# Patient Record
Sex: FEMALE | Race: WHITE | NOT HISPANIC OR LATINO | Employment: FULL TIME | URBAN - METROPOLITAN AREA
[De-identification: names, ages, dates, MRNs, and addresses within clinical notes are randomized per-mention and may not be internally consistent; named-entity substitution may affect disease eponyms.]

---

## 2017-03-20 ENCOUNTER — SLEEP CENTER VISIT (OUTPATIENT)
Dept: SLEEP MEDICINE | Facility: MEDICAL CENTER | Age: 57
End: 2017-03-20
Payer: COMMERCIAL

## 2017-03-20 VITALS
TEMPERATURE: 98.1 F | OXYGEN SATURATION: 96 % | HEIGHT: 71 IN | DIASTOLIC BLOOD PRESSURE: 94 MMHG | WEIGHT: 293 LBS | BODY MASS INDEX: 41.02 KG/M2 | SYSTOLIC BLOOD PRESSURE: 132 MMHG | RESPIRATION RATE: 16 BRPM | HEART RATE: 60 BPM

## 2017-03-20 DIAGNOSIS — G47.33 OSA (OBSTRUCTIVE SLEEP APNEA): ICD-10-CM

## 2017-03-20 PROCEDURE — 99213 OFFICE O/P EST LOW 20 MIN: CPT | Performed by: NURSE PRACTITIONER

## 2017-03-20 NOTE — PATIENT INSTRUCTIONS
1) Continue CPAP at 7cmH20  2) Clean mask and supplies weekly and change them as insurance allows  3) Return in about 6 months (around 9/20/2017) for Compliance, review of symptoms, if not sooner, follow up with GRETEL Guthrie.

## 2017-03-20 NOTE — PROGRESS NOTES
CC:  Here for f/u sleep issues as listed below    HPI:   Perla presents today for follow up obstructive sleep apnea.  PSG from 4/2016 indicated an AHI of 43.1 and low oxygen of 82%.  Currently she is being treated with CPAP @ 11lpD46.  Compliance download from the dates 2/18/2017 - 3/19/2017 indicates she is wearing the device 100% for an avg of 5 hours and 30 minutes per night with a reduced AHI of 0.5.  She does tolerate pressure and mask well.  She wakes up refreshed and denies morning H/A. She is sleeping better overall. She has lost 17 pounds since starting the machine. Has been at a plateau, because of the weather, but plans to get outside in the warm days ahead. She does not like going to the gym. She loves her machine and has more energy! she will continue to clean supplies weekly and change them as insurance allows.         Patient Active Problem List    Diagnosis Date Noted   • TOM (obstructive sleep apnea) 12/07/2016       Past Medical History   Diagnosis Date   • Hypertension    • Chickenpox        Past Surgical History   Procedure Laterality Date   • Hysterectomy laparoscopy     • Laminotomy         Family History   Problem Relation Age of Onset   • Hypertension Mother    • Diabetes Father        Social History     Social History   • Marital Status:      Spouse Name: N/A   • Number of Children: N/A   • Years of Education: N/A     Occupational History   • Not on file.     Social History Main Topics   • Smoking status: Never Smoker    • Smokeless tobacco: Never Used   • Alcohol Use: Yes      Comment: socially   • Drug Use: No   • Sexual Activity: Not on file     Other Topics Concern   • Not on file     Social History Narrative       Current Outpatient Prescriptions   Medication Sig Dispense Refill   • metoprolol (LOPRESSOR) 25 MG Tab Take 25 mg by mouth 2 times a day.     • aspirin 81 MG tablet Take 81 mg by mouth two times a day may change times on alt/days.       No current  "facility-administered medications for this visit.          Allergies: Review of patient's allergies indicates no known allergies.      ROS   Gen: Denies fever, chills, unintentional weight loss, fatigue  Resp:Denies Dyspnea  CV: Denies chest pain, chest tightness  Sleep:Denies morning headache, insomnia, daytime somnolence, snoring, gasping for air, apnea  Neuro: Denies frequent headaches, weakness, dizziness  See HPI.  All other systems reviewed and negative        Vital signs for this encounter:  Filed Vitals:    03/20/17 0830   Height: 1.803 m (5' 10.98\")   Weight: 135.626 kg (299 lb)   Weight % change since last entry.: 0 %   BP: 132/94   Pulse: 60   BMI (Calculated): 41.72   Resp: 16   Temp: 36.7 °C (98.1 °F)   O2 sat % room air: 96 %                   Physical Exam:   Gen:         Alert and oriented, No apparent distress.   Neck:        No Lymphadenopathy.  Lungs:     Clear to auscultation bilaterally.    CV:          Regular rate and rhythm. No murmurs, rubs or gallops.   Abd:         Soft non tender, non distended.            Ext:          No clubbing, cyanosis, edema.    Assessment   1. TOM (obstructive sleep apnea)  DME MASK AND SUPPLIES       PLAN:   Patient Instructions   1) Continue CPAP at 7cmH20  2) Clean mask and supplies weekly and change them as insurance allows  3) Return in about 6 months (around 9/20/2017) for Compliance, review of symptoms, if not sooner, follow up with GRETEL Guthrie.          "

## 2017-03-20 NOTE — MR AVS SNAPSHOT
"Perla Man   3/20/2017 8:40 AM   Sleep Center Visit   MRN: [id number]    Department:  [facility]   Dept Phone:  [phone]    Description:  Female : 1960   Provider:  CASE Colon           Reason for Visit     Apnea Last seen 16      Allergies as of 3/20/2017     No Known Allergies      Vital Signs     Blood Pressure Pulse Temperature Respirations Height Weight    132/94 mmHg 60 36.7 °C (98.1 °F) 16 1.803 m (5' 10.98\") 135.626 kg (299 lb)    Body Mass Index Oxygen Saturation Smoking Status             41.72 kg/m2 96% Never Smoker          Basic Information     Date Of Birth Sex Race Ethnicity Preferred Language    1960 Female White Non- English      Your appointments     Sep 25, 2017  8:00 AM   Follow UP with CASE Colon   [facility] (--)    [address] [address]   [phone]              Problem List              ICD-10-CM Priority Class Noted - Resolved    TOM (obstructive sleep apnea) G47.33   2016 - Present      Health Maintenance        Date Due Completion Dates    IMM DTaP/Tdap/Td Vaccine (1 - Tdap) 1981 ---    PAP SMEAR 1983 ---    MAMMOGRAM 2002 ---    COLONOSCOPY 2012 ---    IMM INFLUENZA (1) 2016 10/1/2015            Current Immunizations     Influenza TIV (IM) 10/1/2015      Below and/or attached are the medications your provider expects you to take. Review all of your home medications and newly ordered medications with your provider and/or pharmacist. Follow medication instructions as directed by your provider and/or pharmacist. Please keep your medication list with you and share with your provider. Update the information when medications are discontinued, doses are changed, or new medications (including over-the-counter products) are added; and carry medication information at all times in the event of emergency situations     Allergies:  No Known " Allergies          Medications  Valid as of: March 20, 2017 -  8:52 AM    Generic Name Brand Name Tablet Size Instructions for use    Aspirin (Tab) aspirin 81 MG Take 81 mg by mouth two times a day may change times on alt/days.        Metoprolol Tartrate (Tab) LOPRESSOR 25 MG Take 25 mg by mouth 2 times a day.        .                 Medicines prescribed today were sent to:     [id number] - [place], [place] - [address]    [address] [place] [address]    Phone: [phone] Fax: [phone]    Open 24 Hours?: No    DME [facility] [place]    [address] [place] [address]    Phone: [phone] Fax: [phone]      Medication refill instructions:       If your prescription bottle indicates you have medication refills left, it is not necessary to call your provider’s office. Please contact your pharmacy and they will refill your medication.    If your prescription bottle indicates you do not have any refills left, you may request refills at any time through one of the following ways: The online [employer] system (except Urgent Care), by calling your provider’s office, or by asking your pharmacy to contact your provider’s office with a refill request. Medication refills are processed only during regular business hours and may not be available until the next business day. Your provider may request additional information or to have a follow-up visit with you prior to refilling your medication.   *Please Note: Medication refills are assigned a new Rx number when refilled electronically. Your pharmacy may indicate that no refills were authorized even though a new prescription for the same medication is available at the pharmacy. Please request the medicine by name with the pharmacy before contacting your provider for a refill.        Instructions    1) Continue CPAP at 7cmH20  2) Clean mask and supplies weekly and change them as insurance allows  3) Return in about 6 months (around  9/20/2017) for Compliance, review of symptoms, if not sooner, follow up with GRETEL Guthrie.              [employer] Access Code: [email or web address]  Expires: 3/27/2017 11:09 AM    [employer]  A secure, online tool to manage your health information     [employer]® is a secure, online tool that connects you to your personalized health information from the privacy of your home -- day or night - making it very easy for you to manage your healthcare. Once the activation process is completed, you can even access your medical information using the [employer] cruzito, which is available for free in the [employer] store or Google Play store.     [employer] provides the following levels of access (as shown below):   My Chart Features   [facility] Doctor [facility]  Urgent  Care Non-[facility]  Primary Care  Doctor   Email your healthcare team securely and privately 24/7 X X X    Manage appointments: schedule your next appointment; view details of past/upcoming appointments X      Request prescription refills. X      View recent personal medical records, including lab and immunizations X X X X   View health record, including health history, allergies, medications X X X X   Read reports about your outpatient visits, procedures, consult and ER notes X X X X   See your discharge summary, which is a recap of your hospital and/or ER visit that includes your diagnosis, lab results, and care plan. X X       How to register for [employer]:  1. Go to  [email or web address].  2. Click on the Sign Up Now box, which takes you to the New Member Sign Up page. You will need to provide the following information:  a. Enter your [employer] Access Code exactly as it appears at the top of this page. (You will not need to use this code after you’ve completed the sign-up process. If you do not sign up before the expiration date, you must request a new code.)   b. Enter your date of birth.   c. Enter your home email address.    d. Click Submit, and follow the next screen’s instructions.  3. Create a [employer] ID. This will be your [employer] login ID and cannot be changed, so think of one that is secure and easy to remember.  4. Create a [employer] password. You can change your password at any time.  5. Enter your Password Reset Question and Answer. This can be used at a later time if you forget your password.   6. Enter your e-mail address. This allows you to receive e-mail notifications when new information is available in [employer].  7. Click Sign Up. You can now view your health information.    For assistance activating your [employer] account, call [phone]

## 2017-09-06 ENCOUNTER — TELEPHONE (OUTPATIENT)
Dept: PULMONOLOGY | Facility: HOSPICE | Age: 57
End: 2017-09-06

## 2017-09-06 DIAGNOSIS — G47.33 OBSTRUCTIVE SLEEP APNEA: ICD-10-CM

## 2017-09-27 ENCOUNTER — SLEEP CENTER VISIT (OUTPATIENT)
Dept: SLEEP MEDICINE | Facility: MEDICAL CENTER | Age: 57
End: 2017-09-27
Payer: COMMERCIAL

## 2017-09-27 VITALS
TEMPERATURE: 97.5 F | HEART RATE: 70 BPM | DIASTOLIC BLOOD PRESSURE: 82 MMHG | HEIGHT: 71 IN | WEIGHT: 293 LBS | OXYGEN SATURATION: 95 % | SYSTOLIC BLOOD PRESSURE: 134 MMHG | BODY MASS INDEX: 41.02 KG/M2 | RESPIRATION RATE: 16 BRPM

## 2017-09-27 DIAGNOSIS — G47.33 OSA (OBSTRUCTIVE SLEEP APNEA): ICD-10-CM

## 2017-09-27 PROCEDURE — 99213 OFFICE O/P EST LOW 20 MIN: CPT | Performed by: NURSE PRACTITIONER

## 2017-09-27 NOTE — PROGRESS NOTES
CC:  Here for f/u sleep issues as listed below    HPI:   Perla presents today for follow up obstructive sleep apnea.  PSG from 4/2016 indicated an AHI of 43.1 and low oxygen of 82%.  Currently she is being treated with CPAP @ 19nxX23.  Compliance download from the dates 8/28/2017 - 9/26/2017 indicates she is wearing the device 100% for an avg of 5 hours and 48 minutes per night with a reduced AHI of 0.5.  She does tolerate pressure and mask well.  She wakes up refreshed and denies morning H/A. She is sleeping better overall and can't sleep without it.  She loves her machine and has more energy! she will continue to clean supplies weekly and change them as insurance allows.         Patient Active Problem List    Diagnosis Date Noted   • BMI 40.0-44.9, adult (CMS-[facility]) 09/27/2017   • TOM (obstructive sleep apnea) 12/07/2016       Past Medical History:   Diagnosis Date   • Chickenpox    • Hypertension        Past Surgical History:   Procedure Laterality Date   • HYSTERECTOMY LAPAROSCOPY     • LAMINOTOMY         Family History   Problem Relation Age of Onset   • Hypertension Mother    • Diabetes Father        Social History     Social History   • Marital status:      Spouse name: N/A   • Number of children: N/A   • Years of education: N/A     Occupational History   • Not on file.     Social History Main Topics   • Smoking status: Never Smoker   • Smokeless tobacco: Never Used   • Alcohol use No      Comment: socially   • Drug use: No   • Sexual activity: Not on file     Other Topics Concern   • Not on file     Social History Narrative   • No narrative on file       Current Outpatient Prescriptions   Medication Sig Dispense Refill   • metoprolol (LOPRESSOR) 25 MG Tab Take 25 mg by mouth 2 times a day.     • aspirin 81 MG tablet Take 81 mg by mouth two times a day may change times on alt/days.       No current facility-administered medications for this visit.           Allergies: Review of patient's allergies  "indicates no known allergies.      ROS   Gen: Denies fever, chills, unintentional weight loss, fatigue  Resp:Denies Dyspnea  CV: Denies chest pain, chest tightness  Sleep:Denies morning headache, insomnia, daytime somnolence, snoring, gasping for air, apnea  Neuro: Denies frequent headaches, weakness, dizziness  See HPI.  All other systems reviewed and negative        Vital signs for this encounter:  Vitals:    09/27/17 0820   Height: 1.803 m (5' 11\")   Weight: (!) 139.7 kg (308 lb)   Weight % change since last entry.: 0 %   BP: 134/82   Pulse: 70   BMI (Calculated): 42.96   Resp: 16   Temp: 36.4 °C (97.5 °F)   O2 sat % room air: 95 %                   Physical Exam:   Gen:         Alert and oriented, No apparent distress.   Neck:        No Lymphadenopathy.  Lungs:     Clear to auscultation bilaterally.    CV:          Regular rate and rhythm. No murmurs, rubs or gallops.   Abd:         Soft non tender, non distended.            Ext:          No clubbing, cyanosis, edema.    Assessment   1. TOM (obstructive sleep apnea)  DME MASK AND SUPPLIES   2. BMI 40.0-44.9, adult (CMS-[facility])  DME MASK AND SUPPLIES       PLAN:   Patient Instructions   1) Continue CPAP at 50imO74  2) Clean mask and supplies weekly and change them as insurance allows  4) Vaccines: Flu vaccine in the future  5) Return in about 6 months (around 3/27/2018) for Compliance, review of symptoms, if not sooner, follow up with GRETEL Guthrie.          "

## 2017-09-27 NOTE — PATIENT INSTRUCTIONS
1) Continue CPAP at 30khD46  2) Clean mask and supplies weekly and change them as insurance allows  4) Vaccines: Flu vaccine in the future  5) Return in about 6 months (around 3/27/2018) for Compliance, review of symptoms, if not sooner, follow up with GRETEL Guthrie.

## 2019-03-20 ENCOUNTER — SLEEP CENTER VISIT (OUTPATIENT)
Dept: SLEEP MEDICINE | Facility: MEDICAL CENTER | Age: 59
End: 2019-03-20
Payer: COMMERCIAL

## 2019-03-20 VITALS
HEART RATE: 70 BPM | BODY MASS INDEX: 41.02 KG/M2 | WEIGHT: 293 LBS | DIASTOLIC BLOOD PRESSURE: 82 MMHG | SYSTOLIC BLOOD PRESSURE: 124 MMHG | TEMPERATURE: 98.6 F | OXYGEN SATURATION: 95 % | HEIGHT: 71 IN | RESPIRATION RATE: 16 BRPM

## 2019-03-20 DIAGNOSIS — E66.01 MORBID OBESITY (HCC): ICD-10-CM

## 2019-03-20 DIAGNOSIS — G47.33 OSA (OBSTRUCTIVE SLEEP APNEA): ICD-10-CM

## 2019-03-20 PROCEDURE — 99213 OFFICE O/P EST LOW 20 MIN: CPT | Performed by: NURSE PRACTITIONER

## 2019-03-20 RX ORDER — METOPROLOL SUCCINATE 25 MG/1
TABLET, EXTENDED RELEASE ORAL
COMMUNITY
Start: 2019-03-02

## 2019-03-20 NOTE — PATIENT INSTRUCTIONS
1) Continue CPAP at 86woY07  2) Clean mask and supplies weekly and change them as insurance allows  3) Vaccines: Flu vaccine in the future  4) Return in about 1 year (around 3/20/2020) for follow up with GRETEL Guthrie, if not sooner, review of symptoms, Compliance.

## 2019-03-20 NOTE — PROGRESS NOTES
CC:  Here for f/u sleep issues as listed below    HPI:     Perla presents today for follow up obstructive sleep apnea.  PMH of HTN.    PSG from 4/2016 indicated an AHI of 43.1 and low oxygen of 82%.  Currently she is being treated with CPAP @ [id number].  Compliance download from the dates 2/19/2019 - 3/20/2019 indicates she is wearing the device 97% for an avg of 5 hours and 28 minutes per night with a reduced AHI of 0.5. She does tolerate pressure and mask well.  She wakes up refreshed and denies morning H/A. She is sleeping better overall and can't sleep without it.  She loves her machine and has more energy! she will continue to clean supplies weekly and change them as insurance allows. BMI is 42. Has been stressed at home with  with recent triple bypass. Started with new diet and plans to start exercise with .          Patient Active Problem List    Diagnosis Date Noted   • Morbid obesity (HCC) 03/20/2019   • BMI 40.0-44.9, adult ([facility]) 09/27/2017   • TOM (obstructive sleep apnea) 12/07/2016       Past Medical History:   Diagnosis Date   • Chickenpox    • Hypertension        Past Surgical History:   Procedure Laterality Date   • HYSTERECTOMY LAPAROSCOPY     • LAMINOTOMY         Family History   Problem Relation Age of Onset   • Hypertension Mother    • Diabetes Father        Social History     Social History   • Marital status:      Spouse name: N/A   • Number of children: N/A   • Years of education: N/A     Occupational History   • Not on file.     Social History Main Topics   • Smoking status: Never Smoker   • Smokeless tobacco: Never Used   • Alcohol use No      Comment: socially   • Drug use: No   • Sexual activity: Not on file     Other Topics Concern   • Not on file     Social History Narrative   • No narrative on file       Current Outpatient Prescriptions   Medication Sig Dispense Refill   • metoprolol SR (TOPROL XL) 25 MG TABLET SR 24 HR      • metoprolol (LOPRESSOR) 25 MG Tab Take  "25 mg by mouth 2 times a day.     • aspirin 81 MG tablet Take 81 mg by mouth two times a day may change times on alt/days.       No current facility-administered medications for this visit.           Allergies: Patient has no known allergies.      ROS   Gen: Denies fever, chills, unintentional weight loss, fatigue  Resp:Denies Dyspnea  CV: Denies chest pain, chest tightness  Sleep:Denies morning headache, insomnia, daytime somnolence, snoring, gasping for air, apnea  Neuro: Denies frequent headaches, weakness, dizziness  See HPI.  All other systems reviewed and negative        Vital signs for this encounter:  Vitals:    03/20/19 1500 03/20/19 1521   Height:  1.803 m (5' 11\")   Weight:  (!) 139.7 kg (308 lb)   Weight % change since last entry.:  0 %   BP:  124/82   Pulse:  70   BMI (Calculated):  42.96   Resp:  16   Temp:  37 °C (98.6 °F)   TempSrc:  Temporal   O2 sat % room air: 95 %                    Physical Exam:   Gen:         Alert and oriented, No apparent distress.   Neck:        No Lymphadenopathy.  Lungs:     Clear to auscultation bilaterally.    CV:          Regular rate and rhythm. No murmurs, rubs or gallops.   Abd:         Soft non tender, non distended.            Ext:          No clubbing, cyanosis, edema.    Assessment   1. BMI 40.0-44.9, adult (HCC)  DME Mask and Supplies    OBESITY COUNSELING (No Charge): Patient identified as having weight management issue.  Appropriate orders and counseling given.   2. TOM (obstructive sleep apnea)  DME Mask and Supplies   3. Morbid obesity (HCC)  OBESITY COUNSELING (No Charge): Patient identified as having weight management issue.  Appropriate orders and counseling given.       Patient is clinically stable and will proceed with following plan.     PLAN:   Patient Instructions   1) Continue CPAP at 12vnS64  2) Clean mask and supplies weekly and change them as insurance allows  3) Vaccines: Flu vaccine in the future  4) Return in about 1 year (around 3/20/2020) " for follow up with GRETEL Guthrie, if not sooner, review of symptoms, Compliance.

## 2021-03-15 DIAGNOSIS — Z23 NEED FOR VACCINATION: ICD-10-CM
